# Patient Record
Sex: FEMALE | Race: WHITE | NOT HISPANIC OR LATINO | URBAN - METROPOLITAN AREA
[De-identification: names, ages, dates, MRNs, and addresses within clinical notes are randomized per-mention and may not be internally consistent; named-entity substitution may affect disease eponyms.]

---

## 2023-03-14 ENCOUNTER — OFFICE (OUTPATIENT)
Dept: URBAN - METROPOLITAN AREA CLINIC 81 | Facility: CLINIC | Age: 68
End: 2023-03-14

## 2023-03-14 VITALS
DIASTOLIC BLOOD PRESSURE: 70 MMHG | SYSTOLIC BLOOD PRESSURE: 120 MMHG | WEIGHT: 151 LBS | OXYGEN SATURATION: 97 % | HEIGHT: 63 IN | HEART RATE: 68 BPM

## 2023-03-14 DIAGNOSIS — N18.2 CHRONIC KIDNEY DISEASE, STAGE 2 (MILD): ICD-10-CM

## 2023-03-14 DIAGNOSIS — R19.5 OTHER FECAL ABNORMALITIES: ICD-10-CM

## 2023-03-14 DIAGNOSIS — K21.9 GASTRO-ESOPHAGEAL REFLUX DISEASE WITHOUT ESOPHAGITIS: ICD-10-CM

## 2023-03-14 DIAGNOSIS — R10.11 RIGHT UPPER QUADRANT PAIN: ICD-10-CM

## 2023-03-14 DIAGNOSIS — R19.4 CHANGE IN BOWEL HABIT: ICD-10-CM

## 2023-03-14 PROCEDURE — 99203 OFFICE O/P NEW LOW 30 MIN: CPT | Performed by: NURSE PRACTITIONER

## 2023-03-14 NOTE — SERVICEHPINOTES
Sinai Flores   is seen for an initial visit today   as a referral from Nicolasa De Dios NP for abdominal pain and also for a colonoscopy consultation.
br
br 
Today she reports that she has been having lower abdominal pain for a few months. She also reports pain in the RUQ quadrant with vomiting. She had an US and it was normal. The pain in the RUQ is better, but still mildly present and dull at times. The pain in the lower abdomen occurs a few times a week. She is currently having a bowel movement every 2-3 days. Her previous bowel pattern was 1 bowel movement daily. Now her bowel movements are hard and small balls. No blood or mucus in stool. She will have occasional diarrhea. She takes Metamucil as needed. 
br brLast colonoscopy was approx 15 years ago and normal.  
Today she denies any nausea, vomiting, dysphagia, change in appetite, fever, chills or fatigue. She has lost 10-15 pounds over the last year without trying. span id="{[id number]}" class="narrative freetextSelected" type="freetext" canedit="true" suppressed="false" nid="[id number]" gid="{[id number]}" bound="false" visited="true"Gi history includes GERD and currently well controlled on Omeprazole 40mg once a day.
br
br  No history of heart attack or stroke. She is not a blood thinner. She does not have sleep apnea. Labs br2/1/2023 HgbA1c- 6.1, CMP- Gluc 107, GFR 56. CBC- normal. UA- normal Procedures br2/10/2023 Gallbladder US- normal /span

## 2023-04-17 ENCOUNTER — AMBULATORY SURGICAL CENTER (OUTPATIENT)
Dept: URBAN - METROPOLITAN AREA SURGERY 18 | Facility: SURGERY | Age: 68
End: 2023-04-17
Payer: MEDICARE

## 2023-04-17 ENCOUNTER — OFFICE (OUTPATIENT)
Dept: URBAN - METROPOLITAN AREA PATHOLOGY 13 | Facility: PATHOLOGY | Age: 68
End: 2023-04-17
Payer: MEDICARE

## 2023-04-17 DIAGNOSIS — D12.0 BENIGN NEOPLASM OF CECUM: ICD-10-CM

## 2023-04-17 DIAGNOSIS — R10.11 RIGHT UPPER QUADRANT PAIN: ICD-10-CM

## 2023-04-17 DIAGNOSIS — R19.4 CHANGE IN BOWEL HABIT: ICD-10-CM

## 2023-04-17 DIAGNOSIS — K59.00 CONSTIPATION, UNSPECIFIED: ICD-10-CM

## 2023-04-17 PROCEDURE — 45385 COLONOSCOPY W/LESION REMOVAL: CPT | Performed by: SPECIALIST

## 2023-04-17 PROCEDURE — 88305 TISSUE EXAM BY PATHOLOGIST: CPT | Performed by: PATHOLOGY

## 2023-04-17 PROCEDURE — 45381 COLONOSCOPY SUBMUCOUS NJX: CPT | Performed by: SPECIALIST

## 2023-05-11 ENCOUNTER — OFFICE (OUTPATIENT)
Dept: URBAN - METROPOLITAN AREA CLINIC 81 | Facility: CLINIC | Age: 68
End: 2023-05-11

## 2023-05-11 VITALS
SYSTOLIC BLOOD PRESSURE: 140 MMHG | WEIGHT: 149 LBS | DIASTOLIC BLOOD PRESSURE: 80 MMHG | OXYGEN SATURATION: 97 % | HEART RATE: 70 BPM | HEIGHT: 63 IN

## 2023-05-11 DIAGNOSIS — R10.30 LOWER ABDOMINAL PAIN, UNSPECIFIED: ICD-10-CM

## 2023-05-11 DIAGNOSIS — R19.5 OTHER FECAL ABNORMALITIES: ICD-10-CM

## 2023-05-11 DIAGNOSIS — R10.11 RIGHT UPPER QUADRANT PAIN: ICD-10-CM

## 2023-05-11 DIAGNOSIS — K21.9 GASTRO-ESOPHAGEAL REFLUX DISEASE WITHOUT ESOPHAGITIS: ICD-10-CM

## 2023-05-11 PROCEDURE — 99214 OFFICE O/P EST MOD 30 MIN: CPT
